# Patient Record
Sex: FEMALE | Race: WHITE | Employment: UNEMPLOYED | URBAN - METROPOLITAN AREA
[De-identification: names, ages, dates, MRNs, and addresses within clinical notes are randomized per-mention and may not be internally consistent; named-entity substitution may affect disease eponyms.]

---

## 2017-03-31 ENCOUNTER — OFFICE VISIT (OUTPATIENT)
Dept: HEMATOLOGY/ONCOLOGY | Facility: HOSPITAL | Age: 37
End: 2017-03-31
Attending: INTERNAL MEDICINE
Payer: COMMERCIAL

## 2017-03-31 VITALS
HEIGHT: 64.49 IN | BODY MASS INDEX: 30.02 KG/M2 | OXYGEN SATURATION: 98 % | TEMPERATURE: 99 F | HEART RATE: 83 BPM | SYSTOLIC BLOOD PRESSURE: 125 MMHG | RESPIRATION RATE: 18 BRPM | DIASTOLIC BLOOD PRESSURE: 70 MMHG | WEIGHT: 178 LBS

## 2017-03-31 DIAGNOSIS — O99.013 ANEMIA AFFECTING PREGNANCY IN THIRD TRIMESTER: Primary | ICD-10-CM

## 2017-03-31 PROBLEM — O99.019 ANEMIA IN PREGNANCY: Status: ACTIVE | Noted: 2017-03-31

## 2017-03-31 LAB
BASOPHILS # BLD AUTO: 0.02 X10(3) UL (ref 0–0.1)
BASOPHILS NFR BLD AUTO: 0.2 %
DEPRECATED HBV CORE AB SER IA-ACNC: 15.5 NG/ML (ref 10–291)
EOSINOPHIL # BLD AUTO: 0.1 X10(3) UL (ref 0–0.3)
EOSINOPHIL NFR BLD AUTO: 0.9 %
ERYTHROCYTE [DISTWIDTH] IN BLOOD BY AUTOMATED COUNT: 12.9 % (ref 11.5–16)
FOLATE (FOLIC ACID), SERUM: 26.6 NG/ML (ref 8.7–24)
HAV AB SERPL IA-ACNC: 287 PG/ML (ref 193–986)
HCT VFR BLD AUTO: 31.5 % (ref 34–50)
HGB BLD-MCNC: 11.1 G/DL (ref 12–16)
IMMATURE GRANULOCYTE COUNT: 0.09 X10(3) UL (ref 0–1)
IMMATURE GRANULOCYTE RATIO %: 0.8 %
IRON SATURATION: 11 % (ref 13–45)
IRON: 61 UG/DL (ref 28–170)
LYMPHOCYTES # BLD AUTO: 1.82 X10(3) UL (ref 0.9–4)
LYMPHOCYTES NFR BLD AUTO: 16.2 %
MCH RBC QN AUTO: 33.9 PG (ref 27–33.2)
MCHC RBC AUTO-ENTMCNC: 35.2 G/DL (ref 31–37)
MCV RBC AUTO: 96.3 FL (ref 81–100)
MONOCYTES # BLD AUTO: 0.84 X10(3) UL (ref 0.1–0.6)
MONOCYTES NFR BLD AUTO: 7.5 %
NEUTROPHIL ABS PRELIM: 8.37 X10 (3) UL (ref 1.3–6.7)
NEUTROPHILS # BLD AUTO: 8.37 X10(3) UL (ref 1.3–6.7)
NEUTROPHILS NFR BLD AUTO: 74.4 %
PLATELET # BLD AUTO: 153 10(3)UL (ref 150–450)
RBC # BLD AUTO: 3.27 X10(6)UL (ref 3.8–5.1)
RED CELL DISTRIBUTION WIDTH-SD: 44.5 FL (ref 35.1–46.3)
TOTAL IRON BINDING CAPACITY: 542 UG/DL (ref 298–536)
TRANSFERRIN: 364 MG/DL (ref 200–360)
WBC # BLD AUTO: 11.2 X10(3) UL (ref 4–13)

## 2017-03-31 PROCEDURE — 99244 OFF/OP CNSLTJ NEW/EST MOD 40: CPT | Performed by: INTERNAL MEDICINE

## 2017-03-31 RX ORDER — MELATONIN
325 2 TIMES DAILY WITH MEALS
Status: ON HOLD | COMMUNITY
End: 2017-05-08

## 2017-03-31 NOTE — PROGRESS NOTES
[facility] Report of Consultation    Patient Name: Tien Hernandez   YOB: 1980   Medical Record Number: [id number]   CSN: [id number]   Consulting Physician: Isaiah Lowe M.D. Referring Physician: No ref.  provider found Other[other] [OTHER] Brother    • Cancer Maternal Uncle      colon ca 61       Social History:    Social History   Marital Status:   Spouse Name: N/A    Years of Education: N/A  Number of Children: N/A     Occupational History  None on file     Soci swings.          Vital Signs:  /70 mmHg  Pulse 83  Temp(Src) 98.7 °F (37.1 °C) (Tympanic)  Resp 18  Ht 1.638 m (5' 4.49\")  Wt 80.74 kg (178 lb)  BMI 30.09 kg/m2  SpO2 98%  LMP  (LMP Unknown)    Performance Status:  ECOG 1    Physical Examination: Plan:  Anemia in pregnancy: The patient's repeat hgb is back to baseline and is within the normal range for the third trimester of pregnancy. Will check Iron levels today to ensure that her stores are replete prior to delivery.   Given the family history of

## 2017-03-31 NOTE — PROGRESS NOTES
Patient is here today for consult with . Patient denies pain. Fatigued. Currently Pregnant. Stated has been on Oral iron 325mg for the last 5 weeks. Medication list and medical history were reviewed and updated.     Education Record    Learner:

## 2017-04-03 ENCOUNTER — TELEPHONE (OUTPATIENT)
Dept: HEMATOLOGY/ONCOLOGY | Facility: HOSPITAL | Age: 37
End: 2017-04-03

## 2017-04-03 NOTE — TELEPHONE ENCOUNTER
Per MD notified patient she is in need for 2 iron infusions. Left [facility] to call and schedule.

## 2017-04-06 ENCOUNTER — OFFICE VISIT (OUTPATIENT)
Dept: HEMATOLOGY/ONCOLOGY | Facility: HOSPITAL | Age: 37
End: 2017-04-06
Attending: INTERNAL MEDICINE
Payer: COMMERCIAL

## 2017-04-06 VITALS
TEMPERATURE: 98 F | BODY MASS INDEX: 30.02 KG/M2 | HEIGHT: 64.49 IN | WEIGHT: 178 LBS | RESPIRATION RATE: 16 BRPM | SYSTOLIC BLOOD PRESSURE: 109 MMHG | HEART RATE: 74 BPM | OXYGEN SATURATION: 98 % | DIASTOLIC BLOOD PRESSURE: 66 MMHG

## 2017-04-06 DIAGNOSIS — O99.019 ANEMIA IN PREGNANCY: Primary | ICD-10-CM

## 2017-04-06 PROCEDURE — 96374 THER/PROPH/DIAG INJ IV PUSH: CPT

## 2017-04-06 NOTE — PROGRESS NOTES
Venofer infused without any complications. Observed for half hour after infusion. Vital signs taken at the end of the 30-minute observation. Patient denied any complaints/issues. Discharged in good condition.  Advised to call for any questions/concerns/issu

## 2017-04-06 NOTE — PROGRESS NOTES
Education Record    Learner:  Patient    Disease / Diagnosis: Iron Deficiency Anemia in Pregnancy    Barriers / Limitations:  None   Comments:    Method:  Brief focused and Reinforcement   Comments:    General Topics:  Plan of care reviewed   Comments:

## 2017-04-10 ENCOUNTER — OFFICE VISIT (OUTPATIENT)
Dept: HEMATOLOGY/ONCOLOGY | Facility: HOSPITAL | Age: 37
End: 2017-04-10
Attending: INTERNAL MEDICINE
Payer: COMMERCIAL

## 2017-04-10 VITALS
DIASTOLIC BLOOD PRESSURE: 70 MMHG | HEART RATE: 80 BPM | HEIGHT: 64.49 IN | BODY MASS INDEX: 30.16 KG/M2 | OXYGEN SATURATION: 97 % | WEIGHT: 178.81 LBS | SYSTOLIC BLOOD PRESSURE: 110 MMHG | TEMPERATURE: 98 F | RESPIRATION RATE: 18 BRPM

## 2017-04-10 DIAGNOSIS — O99.019 ANEMIA IN PREGNANCY: Primary | ICD-10-CM

## 2017-04-10 PROCEDURE — 96374 THER/PROPH/DIAG INJ IV PUSH: CPT

## 2017-04-10 NOTE — PROGRESS NOTES
Education Record    Learner:  Patient    Disease / Diagnosis: anemia in pregnancy    Barriers / Limitations:  None   Comments:    Method:  Discussion   Comments:    General Topics:  Plan of care reviewed   Comments:    Outcome:  Shows understanding   Sherry Alejandre

## 2017-04-10 NOTE — PROGRESS NOTES
Patient tolerated infusion well. Observed for 30 minutes post infusion. Post vitals: /70, HR 80, T- 97.5, R- 18, pain level- 0. Patient discharged to home. Patient was instructed to f/u with Dr. Raghavendra Rivera after delivery.

## 2017-05-08 ENCOUNTER — HOSPITAL ENCOUNTER (INPATIENT)
Facility: HOSPITAL | Age: 37
LOS: 2 days | Discharge: HOME | End: 2017-05-10
Attending: OBSTETRICS & GYNECOLOGY | Admitting: OBSTETRICS & GYNECOLOGY
Payer: COMMERCIAL

## 2017-05-08 PROBLEM — Z34.90 PREGNANCY: Status: ACTIVE | Noted: 2017-05-08

## 2017-05-08 PROCEDURE — 86780 TREPONEMA PALLIDUM: CPT | Performed by: OBSTETRICS & GYNECOLOGY

## 2017-05-08 PROCEDURE — 86850 RBC ANTIBODY SCREEN: CPT | Performed by: OBSTETRICS & GYNECOLOGY

## 2017-05-08 PROCEDURE — 81002 URINALYSIS NONAUTO W/O SCOPE: CPT

## 2017-05-08 PROCEDURE — 85461 HEMOGLOBIN FETAL: CPT | Performed by: OBSTETRICS & GYNECOLOGY

## 2017-05-08 PROCEDURE — 86901 BLOOD TYPING SEROLOGIC RH(D): CPT | Performed by: OBSTETRICS & GYNECOLOGY

## 2017-05-08 PROCEDURE — 85027 COMPLETE CBC AUTOMATED: CPT | Performed by: OBSTETRICS & GYNECOLOGY

## 2017-05-08 PROCEDURE — 0KQM0ZZ REPAIR PERINEUM MUSCLE, OPEN APPROACH: ICD-10-PCS | Performed by: OBSTETRICS & GYNECOLOGY

## 2017-05-08 PROCEDURE — 84112 EVAL AMNIOTIC FLUID PROTEIN: CPT

## 2017-05-08 PROCEDURE — 86900 BLOOD TYPING SEROLOGIC ABO: CPT | Performed by: OBSTETRICS & GYNECOLOGY

## 2017-05-08 RX ORDER — HYDROCODONE BITARTRATE AND ACETAMINOPHEN 5; 325 MG/1; MG/1
2 TABLET ORAL EVERY 4 HOURS PRN
Status: DISCONTINUED | OUTPATIENT
Start: 2017-05-08 | End: 2017-05-10

## 2017-05-08 RX ORDER — IBUPROFEN 600 MG/1
600 TABLET ORAL EVERY 6 HOURS
Status: DISCONTINUED | OUTPATIENT
Start: 2017-05-08 | End: 2017-05-08

## 2017-05-08 RX ORDER — ACETAMINOPHEN 325 MG/1
650 TABLET ORAL EVERY 4 HOURS PRN
Status: DISCONTINUED | OUTPATIENT
Start: 2017-05-08 | End: 2017-05-10

## 2017-05-08 RX ORDER — IBUPROFEN 600 MG/1
600 TABLET ORAL EVERY 6 HOURS
Status: DISCONTINUED | OUTPATIENT
Start: 2017-05-09 | End: 2017-05-10

## 2017-05-08 RX ORDER — SODIUM CHLORIDE, SODIUM LACTATE, POTASSIUM CHLORIDE, CALCIUM CHLORIDE 600; 310; 30; 20 MG/100ML; MG/100ML; MG/100ML; MG/100ML
INJECTION, SOLUTION INTRAVENOUS CONTINUOUS
Status: DISCONTINUED | OUTPATIENT
Start: 2017-05-08 | End: 2017-05-08 | Stop reason: HOSPADM

## 2017-05-08 RX ORDER — BISACODYL 10 MG
10 SUPPOSITORY, RECTAL RECTAL ONCE AS NEEDED
Status: ACTIVE | OUTPATIENT
Start: 2017-05-08 | End: 2017-05-08

## 2017-05-08 RX ORDER — DEXTROSE, SODIUM CHLORIDE, SODIUM LACTATE, POTASSIUM CHLORIDE, AND CALCIUM CHLORIDE 5; .6; .31; .03; .02 G/100ML; G/100ML; G/100ML; G/100ML; G/100ML
INJECTION, SOLUTION INTRAVENOUS AS NEEDED
Status: DISCONTINUED | OUTPATIENT
Start: 2017-05-08 | End: 2017-05-08 | Stop reason: HOSPADM

## 2017-05-08 RX ORDER — SIMETHICONE 80 MG
80 TABLET,CHEWABLE ORAL 3 TIMES DAILY PRN
Status: DISCONTINUED | OUTPATIENT
Start: 2017-05-08 | End: 2017-05-10

## 2017-05-08 RX ORDER — IBUPROFEN 600 MG/1
600 TABLET ORAL ONCE AS NEEDED
Status: DISCONTINUED | OUTPATIENT
Start: 2017-05-08 | End: 2017-05-10

## 2017-05-08 RX ORDER — TERBUTALINE SULFATE 1 MG/ML
0.25 INJECTION, SOLUTION SUBCUTANEOUS AS NEEDED
Status: DISCONTINUED | OUTPATIENT
Start: 2017-05-08 | End: 2017-05-08 | Stop reason: HOSPADM

## 2017-05-08 RX ORDER — DOCUSATE SODIUM 100 MG/1
100 CAPSULE, LIQUID FILLED ORAL
Status: DISCONTINUED | OUTPATIENT
Start: 2017-05-08 | End: 2017-05-10

## 2017-05-08 RX ORDER — ZOLPIDEM TARTRATE 5 MG/1
5 TABLET ORAL NIGHTLY PRN
Status: DISCONTINUED | OUTPATIENT
Start: 2017-05-08 | End: 2017-05-10

## 2017-05-08 RX ORDER — HYDROCODONE BITARTRATE AND ACETAMINOPHEN 5; 325 MG/1; MG/1
1 TABLET ORAL EVERY 4 HOURS PRN
Status: DISCONTINUED | OUTPATIENT
Start: 2017-05-08 | End: 2017-05-10

## 2017-05-08 RX ORDER — EPHEDRINE SULFATE 50 MG/ML
5 INJECTION, SOLUTION INTRAVENOUS AS NEEDED
Status: DISCONTINUED | OUTPATIENT
Start: 2017-05-08 | End: 2017-05-08

## 2017-05-08 RX ORDER — NALBUPHINE HCL 10 MG/ML
2.5 AMPUL (ML) INJECTION
Status: DISCONTINUED | OUTPATIENT
Start: 2017-05-08 | End: 2017-05-10

## 2017-05-08 NOTE — PROGRESS NOTES
Pt is a 31year old female admitted to TRG3/TRG3-A. Patient presents with:  Rupture Of Membranes     Pt is  39w4d intra-uterine pregnancy. History obtained, consents signed. Oriented to room, staff, and plan of care.     Pt states that last nite b

## 2017-05-08 NOTE — PROGRESS NOTES
S; resting comfortably,  O: /74 mmHg  Pulse 70  Temp(Src) 98.2 °F (36.8 °C) (Oral)  Resp 20  Ht 5' 5\" (1.651 m)  Wt 181 lb (82.101 kg)  BMI 30.12 kg/m2  SpO2 97%  LMP 08/04/2016    Strip reactive and ucs every 2-3 min  Pelvic: 4/80/-2  spont rupture

## 2017-05-08 NOTE — PLAN OF CARE
Problem: SAFETY ADULT - FALL  Goal: Free from fall injury  INTERVENTIONS:  - Assess pt frequently for physical needs  - Identify cognitive and physical deficits and behaviors that affect risk of falls.   - Pyrites fall precautions as indicated by assessme
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
## 2017-05-08 NOTE — BRIEF PROCEDURE NOTE
Delivery Note    Date: 17    Preop diagnosis: IUP at term at 39 weeks, srom     Postop diagnosis: same    Procedure:     Attending:     Anesthesia:epidural    Findings: viable female infant, weighing 6,14 , APGARs  9,9.  Placenta deliver

## 2017-05-09 PROCEDURE — 3E0234Z INTRODUCTION OF SERUM, TOXOID AND VACCINE INTO MUSCLE, PERCUTANEOUS APPROACH: ICD-10-PCS | Performed by: OBSTETRICS & GYNECOLOGY

## 2017-05-09 PROCEDURE — 85025 COMPLETE CBC W/AUTO DIFF WBC: CPT | Performed by: OBSTETRICS & GYNECOLOGY

## 2017-05-09 NOTE — PROGRESS NOTES
Pt transferred  to Mother Baby room [phone] in stable condition. [facility] Infant transferred with mother in stable condition.

## 2017-05-09 NOTE — PROGRESS NOTES
Pt up to restroom with 2 assist. Pt voided 300. Pt complained of feeling light-headed/nauseous. Color pale. RN called for assistance. Iv fluids increased. Ammonia tab broken and placed under pt's nose.  Pt then assisted back to bed with 2 assist. Pt states

## 2017-05-09 NOTE — PROGRESS NOTES
Doing well. No complaints. Nursing.    05/08/17 1945 05/08/17 2007 05/08/17 2028 05/09/17  0757   BP: 115/73 127/99  126/70   Pulse: 92 93  73   Temp:   99.1 °F (37.3 °C) 98.4 °F (36.9 °C)   TempSrc:   Oral Oral   Resp:   20 20   Height:       Weight:

## 2017-05-09 NOTE — PROGRESS NOTES
NURSING ADMISSION NOTE      Patient admitted via Wheelchair  Oriented to room. Safety precautions initiated. Bed in low position. Call light in reach. Pt admitted to postpartum from [facility] L&D RN. Pt stable.  Plan of care for  reviewed and i

## 2017-05-09 NOTE — L&D DELIVERY NOTE
[facility]    PATIENT'S NAME: Espinoza Hu   ATTENDING PHYSICIAN: Melissa Martinez M.D.    PATIENT ACCOUNT #: [de-identified] LOCATION:  [address] [place] [address]   MEDICAL RECORD #: [id number] YOB: 1980   ADMISSION DATE: 05/08/2017 Fredda Sever 21:29:15  [facility] [id number]  /

## 2017-05-10 VITALS
HEART RATE: 67 BPM | OXYGEN SATURATION: 97 % | HEIGHT: 65 IN | DIASTOLIC BLOOD PRESSURE: 52 MMHG | WEIGHT: 181 LBS | TEMPERATURE: 99 F | SYSTOLIC BLOOD PRESSURE: 110 MMHG | RESPIRATION RATE: 18 BRPM | BODY MASS INDEX: 30.16 KG/M2

## 2017-05-10 PROCEDURE — 85025 COMPLETE CBC W/AUTO DIFF WBC: CPT | Performed by: OBSTETRICS & GYNECOLOGY

## 2017-05-10 RX ORDER — IBUPROFEN 600 MG/1
600 TABLET ORAL EVERY 6 HOURS PRN
Qty: 30 TABLET | Refills: 0 | Status: SHIPPED | OUTPATIENT
Start: 2017-05-10

## 2017-05-10 NOTE — PROGRESS NOTES
Discharge patient home as order. Teaching complete, patient feel comfortable to take care herself and  infant. Discharge procedure complete. Hugs and kisses off. Sent both mom and infant to their family car @ [phone].

## 2017-05-10 NOTE — DISCHARGE SUMMARY
[facility]  Discharge Summary    [address] [facility] Patient Status:  Inpatient    1980 MRN [id number]   [facility] 1SW-J Attending Ayan Keen MD, MD   [facility] Day # 2 PCP Heydi Franklin MD     Date of Admission:

## 2017-05-10 NOTE — PROGRESS NOTES
Pt stable. Denies pain at this time. Updated on plan of care. Pt questions answered. Call light within reach. Will continue to monitor.

## 2017-05-10 NOTE — PLAN OF CARE
Problem: POSTPARTUM  Goal: Optimize infant feeding at the breast  INTERVENTIONS:  - Initiate breast feeding within first hour after birth. - Monitor effectiveness of current breast feeding efforts. - Assess support systems available to mother/family.   -

## 2017-05-10 NOTE — PROGRESS NOTES
PPD2    S: doing well, bleeding minimal and pain well controled with motrin  Breastfeeding  O: /52 mmHg  Pulse 67  Temp(Src) 98.7 °F (37.1 °C) (Oral)  Resp 18  Ht 5' 5\" (1.651 m)  Wt 181 lb (82.101 kg)  BMI 30.12 kg/m2  SpO2 97%  LMP 08/04/2016  Eliza

## 2017-05-10 NOTE — PLAN OF CARE
Problem: POSTPARTUM  Goal: Long Term Goal:Experiences normal postpartum course  INTERVENTIONS:  - Assess and monitor vital signs and lab values. - Assess fundus and lochia. - Provide ice/sitz baths for perineum discomfort.   - Monitor healing of incision/

## 2017-05-15 ENCOUNTER — TELEPHONE (OUTPATIENT)
Dept: OBGYN UNIT | Facility: HOSPITAL | Age: 37
End: 2017-05-15

## 2017-05-15 NOTE — PROGRESS NOTES
REV'D SELF AND INFANT CARE WITH MOM. VERBALIZES UNDERSTANDING OF INSTRUCTIONS REV'D. ENCOURAGED TO FOLLOW-UP WITH [facility] AS DIRECTED AND WITH QUESTIONS. PT DENIES ANY BP ISSUES.

## 2017-10-30 ENCOUNTER — TELEPHONE (OUTPATIENT)
Dept: FAMILY MEDICINE CLINIC | Facility: CLINIC | Age: 37
End: 2017-10-30

## 2017-10-30 NOTE — TELEPHONE ENCOUNTER
Pt as moved out of [facility] (to [place], [address] Pt completed a Medical Records Release of Information Form requesting for her Medical Records as seen in our office as a New Pt in 2016.   Request was an easy one to address so Claude Herrlich in Med Records completed th

## (undated) NOTE — MR AVS SNAPSHOT
After Visit Summary   4/10/2017    Cory Pond    MRN: [id number]           Diagnoses this Visit     Anemia in pregnancy    -  Primary       Allergies     No Known Allergies      Your Vital Signs Were     BP Pulse Temp(Src) Resp    110

## (undated) NOTE — MR AVS SNAPSHOT
After Visit Summary   4/6/2017    Deangelo Egan    MRN: [id number]           Diagnoses this Visit     Anemia in pregnancy    -  Primary       Allergies     No Known Allergies      Your Vital Signs Were     BP Pulse Temp(Src) Resp    134/

## (undated) NOTE — IP AVS SNAPSHOT
[facility] [place] [address] [place], [address] ~ [phone]                Discharge Summary   2017    [address] [facility]           Admission Information        Provider Department    2017 Haley Mitchell MD  1sw · Visual changes (seeing spots, blurred vision or partial vision loss)  · Feeling nauseated or vomiting  · Stomach pain/heartburn  · Swelling in your hands, feet or face  · Sudden weight gain  · Shortness of breath  · \"Just not feeling right\"        Foll 16.2 (03/31/17)  7.5 (03/31/17)  0.9 (03/31/17)  0.2  (03/31/17)  8.37 (H) (03/31/17)  1.82 (03/31/17)  0.84 (H) (03/31/17)  0.10 (03/31/17)  0.02      Radiology Exams     None      Patient Education    Postpartum Education  Resolved   Safety Resolved   Re you to explore options for quitting.     - If you have concerns related to behavioral health issues or thoughts of harming yourself, contact [facility] at [phone].     - If you don’t have insurance, Rhonda Betancourt

## (undated) NOTE — MR AVS SNAPSHOT
After Visit Summary   3/31/2017    Debra Ildefonso    MRN: [id number]           Diagnoses this Visit     Anemia affecting pregnancy in third trimester    -  Primary       Allergies     No Known Allergies      Your Vital Signs Were     BP P Component Value Flag Ref Range Units Status    Iron 61    ug/dL Final    Transferrin 364 (H) 200-360  mg/dL Final    Total Iron Binding Capacity 542 (H) 298-536  ug/dL Final    Iron Saturation 11 (L) 13-45  % Final         Result Summary for [facility] discharge instructions in [employer] by going to Visits < Admission Summaries. If you've been to the Emergency Department or your doctor's office, you can view your past visit information in [employer] by going to Visits < Visit Summaries. [employer] questions?